# Patient Record
Sex: MALE | Race: WHITE | NOT HISPANIC OR LATINO
[De-identification: names, ages, dates, MRNs, and addresses within clinical notes are randomized per-mention and may not be internally consistent; named-entity substitution may affect disease eponyms.]

---

## 2023-06-29 ENCOUNTER — APPOINTMENT (OUTPATIENT)
Dept: ORTHOPEDIC SURGERY | Facility: CLINIC | Age: 53
End: 2023-06-29
Payer: COMMERCIAL

## 2023-06-29 VITALS — HEIGHT: 70 IN | WEIGHT: 204 LBS | BODY MASS INDEX: 29.2 KG/M2

## 2023-06-29 DIAGNOSIS — Z78.9 OTHER SPECIFIED HEALTH STATUS: ICD-10-CM

## 2023-06-29 PROCEDURE — 99203 OFFICE O/P NEW LOW 30 MIN: CPT

## 2023-06-29 PROCEDURE — 73130 X-RAY EXAM OF HAND: CPT | Mod: RT

## 2023-06-29 RX ORDER — METHYLPREDNISOLONE 4 MG/1
4 TABLET ORAL
Qty: 1 | Refills: 0 | Status: ACTIVE | COMMUNITY
Start: 2023-06-29 | End: 1900-01-01

## 2023-06-29 NOTE — DISCUSSION/SUMMARY
[de-identified] : Discussed the nature of the diagnosis and risk and benefits of different modalities of treatment.\par X-rays reviewed.\par Discussed conservative management. \par MDP Rx\par Warm compress.\par RTO 3 weeks. \par

## 2023-06-29 NOTE — HISTORY OF PRESENT ILLNESS
[5] : 5 [6] : 6 [Dull/Aching] : dull/aching [Throbbing] : throbbing [Meds] : meds [Ice] : ice [Full time] : Work status: full time [de-identified] : 52 year old male presenting with RIGHT hand pain for the past 10 days. He states he was removing carpet from his stairs and pulling out the staples before he started having this pain.  [FreeTextEntry1] : right hand  [FreeTextEntry3] : 10 days ago  [FreeTextEntry5] : pt was removing carpet from his stairs, sxs began after  [FreeTextEntry6] : swelling  [de-identified] : making a fist  [de-identified] : construction

## 2023-06-29 NOTE — PHYSICAL EXAM
[MCP Joint] : MCP joint [3rd] : 3rd [A1-Pulley] : A1-pulley [Right] : right hand [FreeTextEntry1] : degenerative changes of the middle MP joint. signs of old healed 5th metacarpal fx

## 2023-07-17 ENCOUNTER — APPOINTMENT (OUTPATIENT)
Dept: ORTHOPEDIC SURGERY | Facility: CLINIC | Age: 53
End: 2023-07-17
Payer: COMMERCIAL

## 2023-07-17 VITALS — HEIGHT: 70 IN | WEIGHT: 204 LBS | BODY MASS INDEX: 29.2 KG/M2

## 2023-07-17 DIAGNOSIS — M19.041 PRIMARY OSTEOARTHRITIS, RIGHT HAND: ICD-10-CM

## 2023-07-17 PROCEDURE — 99213 OFFICE O/P EST LOW 20 MIN: CPT | Mod: 25

## 2023-07-17 PROCEDURE — 20605 DRAIN/INJ JOINT/BURSA W/O US: CPT | Mod: RT

## 2023-07-17 NOTE — HISTORY OF PRESENT ILLNESS
[5] : 5 [2] : 2 [Full time] : Work status: full time [de-identified] : 52 year old male followed for RT middle finger MP arthritis. He has been managing conservatively and completed MDP. He is still with swelling and tenderness. \par  [de-identified] : no treatment at this time

## 2023-07-17 NOTE — DISCUSSION/SUMMARY
[de-identified] : Discussed the nature of the diagnosis and risk and benefits of different modalities of treatment.\par Discussed conservative management vs CSI. \par Pt would like a CSI.\par Done today and tolerated well.\par All questions answered.\par

## 2023-08-14 ENCOUNTER — APPOINTMENT (OUTPATIENT)
Dept: ORTHOPEDIC SURGERY | Facility: CLINIC | Age: 53
End: 2023-08-14